# Patient Record
(demographics unavailable — no encounter records)

---

## 2024-10-31 NOTE — DATA REVIEWED
[FreeTextEntry1] : LABS: 9/9/2024: TSH 4.11 Free T4  1.34 Na 138 Aldosterone < 1 PRA 3.77 ACTH 48   3/27/2024 Na 137 K 5.3 ACTH 33 Adrenal antibodies: negative PRA 5.2  Thyroid US 4/2/2024: right lower pole complex 1.2 x 0.6 x 0.8 cm nodule left upper pole complex/ spongiform 1.3 x 0.8 x 1 cm nodule left lower pole 1.8 x 1 x 1.6 cm complex nodule
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
## 2024-10-31 NOTE — REVIEW OF SYSTEMS
[Fatigue] : no fatigue [Lower Ext Edema] : lower extremity edema [Shortness Of Breath] : shortness of breath [Dizziness] : no dizziness
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
## 2025-03-19 NOTE — DATA REVIEWED
[FreeTextEntry1] : LABS: 3/10/2025: DHEAS 1.6 TSH 2.53 Onur 1.6 PRA 5.288 ACTH 96  9/9/2024: TSH 4.11 Free T4  1.34 Na 138 Aldosterone < 1 PRA 3.77 ACTH 48   3/27/2024 Na 137 K 5.3 ACTH 33 Adrenal antibodies: negative PRA 5.2  Thyroid US 4/2/2024: right lower pole complex 1.2 x 0.6 x 0.8 cm nodule left upper pole complex/ spongiform 1.3 x 0.8 x 1 cm nodule left lower pole 1.8 x 1 x 1.6 cm complex nodule

## 2025-03-19 NOTE — CONSULT LETTER
[Dear  ___] : Dear  [unfilled], [Consult Letter:] : I had the pleasure of evaluating your patient, [unfilled]. [Please see my note below.] : Please see my note below. [Consult Closing:] : Thank you very much for allowing me to participate in the care of this patient.  If you have any questions, please do not hesitate to contact me. [Sincerely,] : Sincerely, [FreeTextEntry3] : Brad Duong MD, FACE Chief, Div. of Endocrinology, [facility], [facility] , [employer] at [facility]